# Patient Record
Sex: FEMALE | Race: BLACK OR AFRICAN AMERICAN | Employment: FULL TIME | URBAN - METROPOLITAN AREA
[De-identification: names, ages, dates, MRNs, and addresses within clinical notes are randomized per-mention and may not be internally consistent; named-entity substitution may affect disease eponyms.]

---

## 2024-02-19 ENCOUNTER — OFFICE VISIT (OUTPATIENT)
Age: 44
End: 2024-02-19
Payer: COMMERCIAL

## 2024-02-19 VITALS
SYSTOLIC BLOOD PRESSURE: 111 MMHG | OXYGEN SATURATION: 99 % | HEIGHT: 64 IN | DIASTOLIC BLOOD PRESSURE: 76 MMHG | RESPIRATION RATE: 16 BRPM | WEIGHT: 159.7 LBS | BODY MASS INDEX: 27.26 KG/M2 | HEART RATE: 88 BPM

## 2024-02-19 DIAGNOSIS — E11.9 TYPE 2 DIABETES MELLITUS WITHOUT COMPLICATION, WITHOUT LONG-TERM CURRENT USE OF INSULIN (HCC): Primary | ICD-10-CM

## 2024-02-19 PROCEDURE — 99214 OFFICE O/P EST MOD 30 MIN: CPT | Performed by: INTERNAL MEDICINE

## 2024-02-19 RX ORDER — TIRZEPATIDE 10 MG/.5ML
10 INJECTION, SOLUTION SUBCUTANEOUS WEEKLY
Qty: 6 ML | Refills: 2 | Status: SHIPPED | OUTPATIENT
Start: 2024-02-19

## 2024-02-19 NOTE — PROGRESS NOTES
History of Present Illness: Olga Rivera is a  43 y.o. female seen in follow up for discussion related to PCOS and BMI 41.      Initial visit:   Reports that she was diagnosed with PCOS by Shree, testosterone was assessed and it was high. Was begun on metformin, taking 500mg BID until BG dropped to the 61 range and PCP told her to reduce  it to 1 tablet daily. Happened when she had worked out, went to the car, felt shaky, very anxious- it resolved on its own without eating. Is experiencing shiloh-menopause. Exercising for an hour a day, not counting calories. Does not snore.      Following with hematology for thrombocytosis, ALBARO negative, has never had bone marrow bx and is nervous about this.      11/16/2022: Has lost 4 pounds with metformin, is getting  in May.  No history of pancreatitis previously.  Did meet with hematology regarding  thrombocytosis who told her that she \"does not need to worry about it\".  Has never had a bone marrow biopsy.      01/31/2023: Very nervous about taking the GLP-1 agonist, worried that it will cause her to have cancer or be nauseated, have diarrhea.  Does have diarrhea with metformin.  Stressed about her upcoming wedding in May.     05/11/2023: Wedding is this Saturday, has lost about 30 pounds.  Has not experienced nausea or diarrhea.  Eating 1200 angelo a day.  Does have back pain related to positioning at work, thinks that it probably is musculoskeletal in nature.  Not having any abdominal pain.  Would like to stay at 10 mg and not advance to 12.5 or 15 mg.     09/10/2023: Down about 60 lbs overall- eating frequent small meals such as eggs/protein shake/ chicken baked/ cabbage.     02/19/2024: Weight is at 159, tracking calories- taking mounjaro 10mg weekly- not currently exercising, prefers to continue. 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    Current Outpatient Medications:     Tirzepatide (MOUNJARO) 10 MG/0.5ML SOPN SC injection, Inject

## 2024-02-20 ENCOUNTER — CLINICAL DOCUMENTATION (OUTPATIENT)
Age: 44
End: 2024-02-20

## 2024-03-30 DIAGNOSIS — G43.709 CHRONIC MIGRAINE WITHOUT AURA, NOT INTRACTABLE, WITHOUT STATUS MIGRAINOSUS: Primary | ICD-10-CM

## 2024-04-01 RX ORDER — RIMEGEPANT SULFATE 75 MG/75MG
TABLET, ORALLY DISINTEGRATING ORAL
Qty: 8 TABLET | Refills: 5 | Status: SHIPPED | OUTPATIENT
Start: 2024-04-01

## 2024-04-15 RX ORDER — TIRZEPATIDE 5 MG/.5ML
5 INJECTION, SOLUTION SUBCUTANEOUS
Qty: 2 ML | Refills: 0 | Status: SHIPPED | OUTPATIENT
Start: 2024-04-15

## 2024-04-15 NOTE — TELEPHONE ENCOUNTER
Pt was called and made aware that her Mounjaro PA is coming up for renewal however, as of Jan 1 she no longer meets her insurance requirements for an approval. After explaining the approval requirements, she then asked whether or not she can see if she can get the Zepbound Px.  Pt was made aware that coverage for that rx is based upon whether or not her employer has a weight management contract and the only way of knowing that is to complete a PA.

## 2024-04-24 ENCOUNTER — CLINICAL DOCUMENTATION (OUTPATIENT)
Age: 44
End: 2024-04-24

## 2024-04-25 RX ORDER — TIRZEPATIDE 5 MG/.5ML
5 INJECTION, SOLUTION SUBCUTANEOUS
Qty: 6 ML | Refills: 1 | Status: SHIPPED | OUTPATIENT
Start: 2024-04-25

## 2024-06-17 NOTE — TELEPHONE ENCOUNTER
Pt stated that since the Mounjaro was decrease to 5 mg, she feel as if her bs  are dropping and she is eating more throughout the day.   Pt stated that this did not happen while taking the  10  mg.    Please advise.

## 2024-06-19 RX ORDER — TIRZEPATIDE 10 MG/.5ML
10 INJECTION, SOLUTION SUBCUTANEOUS WEEKLY
Qty: 2 ML | Refills: 2 | Status: SHIPPED | OUTPATIENT
Start: 2024-06-19

## 2024-06-19 RX ORDER — GLUCOSAMINE HCL/CHONDROITIN SU 500-400 MG
CAPSULE ORAL
Qty: 50 STRIP | Refills: 2 | Status: SHIPPED | OUTPATIENT
Start: 2024-06-19

## 2024-06-24 ENCOUNTER — OFFICE VISIT (OUTPATIENT)
Age: 44
End: 2024-06-24
Payer: COMMERCIAL

## 2024-06-24 VITALS
WEIGHT: 154.2 LBS | SYSTOLIC BLOOD PRESSURE: 104 MMHG | RESPIRATION RATE: 16 BRPM | HEART RATE: 91 BPM | BODY MASS INDEX: 26.32 KG/M2 | HEIGHT: 64 IN | DIASTOLIC BLOOD PRESSURE: 64 MMHG | OXYGEN SATURATION: 99 %

## 2024-06-24 DIAGNOSIS — N95.1 HOT FLASHES DUE TO MENOPAUSE: ICD-10-CM

## 2024-06-24 PROCEDURE — G2211 COMPLEX E/M VISIT ADD ON: HCPCS | Performed by: INTERNAL MEDICINE

## 2024-06-24 PROCEDURE — 99214 OFFICE O/P EST MOD 30 MIN: CPT | Performed by: INTERNAL MEDICINE

## 2024-06-24 NOTE — PROGRESS NOTES
taken it yet.    Current Outpatient Medications:     blood glucose monitor strips, Test as needed for symptoms of irregular blood glucose., Disp: 50 strip, Rfl: 2    blood glucose monitor kit and supplies, Test as needed for symptoms of irregular blood glucose. Dispense all needed supplies to include: monitor, strips, lancing device, lancets, control solutions, alcohol swabs., Disp: 1 kit, Rfl: 0    Tirzepatide-Weight Management (ZEPBOUND) 5 MG/0.5ML SOAJ, Inject 5 mg into the skin every 7 days, Disp: 6 mL, Rfl: 1    rimegepant sulfate (NURTEC) 75 MG TBDP, Take 75 mg by mouth as needed (Migraine), Disp: 8 tablet, Rfl: 5    Tirzepatide (MOUNJARO) 10 MG/0.5ML SOPN SC injection, Inject 0.5 mLs into the skin once a week (Patient taking differently: Inject 0.5 mLs into the skin once a week WILL SWITCH ON WEDNESDAY), Disp: 6 mL, Rfl: 2    cyanocobalamin 1000 MCG tablet, Take by mouth three times a week, Disp: , Rfl:     vitamin D3 (CHOLECALCIFEROL) 125 MCG (5000 UT) TABS tablet, Take by mouth daily, Disp: , Rfl:     Magnesium Oxide 500 MG TABS, Take by mouth, Disp: , Rfl:     RABEprazole (ACIPHEX) 20 MG tablet, TAKE ONE TABLET BY MOUTH DAILY FOR HEART BURN, Disp: , Rfl:     vitamin B-2 (RIBOFLAVIN) 100 MG TABS tablet, Take 1 tablet by mouth daily, Disp: , Rfl:     sertraline (ZOLOFT) 50 MG tablet, TAKE ONE AND ONE-HALF TABLETS BY MOUTH DAILY FOR ANXIETY WITH DEPRESSION  Indications: anxiousness associated with depression, Disp: , Rfl:     triamcinolone (NASACORT) 55 MCG/ACT nasal inhaler, 2 sprays daily, Disp: , Rfl:     rizatriptan (MAXALT) 10 MG tablet, Take 1 tablet by mouth as needed for Migraine May repeat in 2 hours if needed (Patient not taking: Reported on 6/24/2024), Disp: 9 tablet, Rfl: 1    Cholecalciferol 50 MCG (2000 UT) CAPS, Take by mouth daily (Patient not taking: Reported on 6/24/2024), Disp: , Rfl:    Social Hx: working for health dept      Review of Systems:    See HPI       Physical Examination:

## 2024-11-01 ENCOUNTER — OFFICE VISIT (OUTPATIENT)
Age: 44
End: 2024-11-01

## 2024-11-01 VITALS
HEART RATE: 72 BPM | SYSTOLIC BLOOD PRESSURE: 120 MMHG | HEIGHT: 64 IN | BODY MASS INDEX: 25.95 KG/M2 | WEIGHT: 152 LBS | DIASTOLIC BLOOD PRESSURE: 84 MMHG

## 2024-11-01 DIAGNOSIS — R23.2 HOT FLASHES: ICD-10-CM

## 2024-11-01 DIAGNOSIS — E11.9 TYPE 2 DIABETES MELLITUS WITHOUT COMPLICATION, WITHOUT LONG-TERM CURRENT USE OF INSULIN (HCC): Primary | ICD-10-CM

## 2024-11-01 RX ORDER — ACETAMINOPHEN 500 MG
TABLET ORAL
COMMUNITY

## 2024-11-01 RX ORDER — ESTRADIOL 10 UG/1
10 INSERT VAGINAL
COMMUNITY
Start: 2024-04-29

## 2024-11-01 RX ORDER — TIRZEPATIDE 10 MG/.5ML
10 INJECTION, SOLUTION SUBCUTANEOUS
Qty: 6 ML | Refills: 3 | Status: SHIPPED | OUTPATIENT
Start: 2024-11-01

## 2024-11-01 NOTE — PROGRESS NOTES
associated with depression, Disp: , Rfl:     triamcinolone (NASACORT) 55 MCG/ACT nasal inhaler, 2 sprays daily, Disp: , Rfl:    Social Hx: working for health dept      Review of Systems:    See HPI       Physical Examination:   Visit Vitals  /71   Pulse 82   Ht 1.626 m (5' 4\")   Wt 68.9 kg (152 lb)   BMI 26.09 kg/m²         - GENERAL: NCAT, Appears well nourished   - EYES: EOMI, non-icteric, no proptosis   - Ear/Nose/Throat: NCAT, no visible inflammation or masses   - CARDIOVASCULAR: no cyanosis, no visible JVD   - RESPIRATORY: respiratory effort normal without  any distress or labored breathing   - MUSCULOSKELETAL: Normal ROM of neck and upper extremities observed   - SKIN: No rash on face  - NEUROLOGIC:  No facial asymmetry (Cranial nerve 7 motor function), No gaze palsy   - PSYCHIATRIC: Normal  affect, Normal insight and judgement       Data Reviewed:         Results for RICHIE SIMONS (MRN [id number]) as of 5/20/2022 13:50     Lab Results   Component Value Date    LABA1C 5.3 06/22/2021    LABA1C 5.1 08/11/2020    LABA1C 5.5 07/08/2019     Lab Results   Component Value Date    CREATININE 0.79 06/22/2021        Assessment/Plan: This is a very pleasant 44 y.o. female seen in follow-up for discussion related to PCOS, BMI 41. Lost about 30 pounds as of May 2023. As of 02/2024, down to 159lbs- initial weight was 241lbs- has almost lost 100 lbs. June 2024 plan to stay at 10mg dose of zepbound, continue mounjaro 10mg 11/2024.       #PCOS, insulin resistance, BMI 41-->26, type 2 diabetes   -continue zepbound 10mg once weekly   -Hold metformin while taking GLP-1 agonist   -reviewed post-prandial hypERinsulinemia related to insulin resistance   -check BG if you feel sx of hypOglycemia, glucometer sample given   -1800-2000kcal restriction daily   -Continue resistance/weight training      #Hot flashes   -Assess for perimenopause with LH/FSH and estradiol levels, assess TSH   -Patient is understandably hesitant to

## 2024-11-07 ENCOUNTER — CLINICAL DOCUMENTATION (OUTPATIENT)
Age: 44
End: 2024-11-07

## 2025-03-20 ENCOUNTER — TELEPHONE (OUTPATIENT)
Age: 45
End: 2025-03-20

## 2025-03-20 NOTE — TELEPHONE ENCOUNTER
Pt called and stated that she had labs drawn and her glucose level was 45. Pcp requested to have pt to contact Dr Paredes to see if additional labs are needed. Pt was then asked of how she was feeling at the time of her lab draw and she stated that she felt as if she may had been dihydrated and experiencing what she  thought may have been a brain fog but other than that, she felt fine.      She then stated that she is due to have a colonoscpy this upcoming Monday and was told to stop her medications 7 days prior. She stated that she stopped her Mounjaro last week and wanted to know if that was ok today.

## 2025-05-06 ENCOUNTER — OFFICE VISIT (OUTPATIENT)
Age: 45
End: 2025-05-06
Payer: COMMERCIAL

## 2025-05-06 VITALS
SYSTOLIC BLOOD PRESSURE: 108 MMHG | RESPIRATION RATE: 16 BRPM | BODY MASS INDEX: 25.13 KG/M2 | DIASTOLIC BLOOD PRESSURE: 69 MMHG | OXYGEN SATURATION: 100 % | HEART RATE: 93 BPM | WEIGHT: 147.2 LBS | HEIGHT: 64 IN

## 2025-05-06 DIAGNOSIS — E11.9 TYPE 2 DIABETES MELLITUS WITHOUT COMPLICATION, WITHOUT LONG-TERM CURRENT USE OF INSULIN (HCC): ICD-10-CM

## 2025-05-06 DIAGNOSIS — E16.2 LOW BLOOD SUGAR: Primary | ICD-10-CM

## 2025-05-06 LAB — GLUCOSE, POC: 80 MG/DL

## 2025-05-06 PROCEDURE — G2211 COMPLEX E/M VISIT ADD ON: HCPCS | Performed by: INTERNAL MEDICINE

## 2025-05-06 PROCEDURE — 82947 ASSAY GLUCOSE BLOOD QUANT: CPT | Performed by: INTERNAL MEDICINE

## 2025-05-06 PROCEDURE — 99214 OFFICE O/P EST MOD 30 MIN: CPT | Performed by: INTERNAL MEDICINE

## 2025-05-06 RX ORDER — TIRZEPATIDE 10 MG/.5ML
10 INJECTION, SOLUTION SUBCUTANEOUS
Qty: 6 ML | Refills: 3 | Status: SHIPPED | OUTPATIENT
Start: 2025-05-06

## 2025-05-06 NOTE — PROGRESS NOTES
History of Present Illness: Olga Rivera is a  45 y.o. female seen in follow up for discussion related to PCOS and BMI 41.      Initial visit:   Reports that she was diagnosed with PCOS by Shree, testosterone was assessed and it was high. Was begun on metformin, taking 500mg BID until BG dropped to the 61 range and PCP told her to reduce  it to 1 tablet daily. Happened when she had worked out, went to the car, felt shaky, very anxious- it resolved on its own without eating. Is experiencing shiloh-menopause. Exercising for an hour a day, not counting calories. Does not snore.      Following with hematology for thrombocytosis, ALBARO negative, has never had bone marrow bx and is nervous about this.      11/16/2022: Has lost 4 pounds with metformin, is getting  in May.  No history of pancreatitis previously.  Did meet with hematology regarding  thrombocytosis who told her that she \"does not need to worry about it\".  Has never had a bone marrow biopsy.      01/31/2023: Very nervous about taking the GLP-1 agonist, worried that it will cause her to have cancer or be nauseated, have diarrhea.  Does have diarrhea with metformin.  Stressed about her upcoming wedding in May.     05/11/2023: Wedding is this Saturday, has lost about 30 pounds.  Has not experienced nausea or diarrhea.  Eating 1200 angelo a day.  Does have back pain related to positioning at work, thinks that it probably is musculoskeletal in nature.  Not having any abdominal pain.  Would like to stay at 10 mg and not advance to 12.5 or 15 mg.     09/10/2023: Down about 60 lbs overall- eating frequent small meals such as eggs/protein shake/ chicken baked/ cabbage.     02/19/2024: Weight is at 159, tracking calories- taking mounjaro 10mg weekly- not currently exercising, prefers to continue. 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    06/24/2024: Weght currently 154 lbs- dos have severe hot flashes, has veozah at home just hasn't